# Patient Record
Sex: FEMALE | Race: WHITE | NOT HISPANIC OR LATINO | URBAN - METROPOLITAN AREA
[De-identification: names, ages, dates, MRNs, and addresses within clinical notes are randomized per-mention and may not be internally consistent; named-entity substitution may affect disease eponyms.]

---

## 2023-11-14 ENCOUNTER — HOSPITAL ENCOUNTER (EMERGENCY)
Facility: HOSPITAL | Age: 88
Discharge: HOME | End: 2023-11-15
Attending: EMERGENCY MEDICINE
Payer: MEDICARE

## 2023-11-14 DIAGNOSIS — M54.50 BILATERAL LOW BACK PAIN WITHOUT SCIATICA, UNSPECIFIED CHRONICITY: ICD-10-CM

## 2023-11-14 DIAGNOSIS — R00.0 TACHYCARDIA: ICD-10-CM

## 2023-11-14 DIAGNOSIS — R91.8 MASS OF RIGHT LUNG: Primary | ICD-10-CM

## 2023-11-14 PROCEDURE — P9612 CATHETERIZE FOR URINE SPEC: HCPCS

## 2023-11-14 PROCEDURE — 99285 EMERGENCY DEPT VISIT HI MDM: CPT | Mod: 25

## 2023-11-14 PROCEDURE — 85025 COMPLETE CBC W/AUTO DIFF WBC: CPT | Performed by: EMERGENCY MEDICINE

## 2023-11-14 PROCEDURE — 96360 HYDRATION IV INFUSION INIT: CPT

## 2023-11-14 PROCEDURE — 83735 ASSAY OF MAGNESIUM: CPT | Performed by: EMERGENCY MEDICINE

## 2023-11-14 PROCEDURE — 80053 COMPREHEN METABOLIC PANEL: CPT | Performed by: EMERGENCY MEDICINE

## 2023-11-15 VITALS
RESPIRATION RATE: 23 BRPM | TEMPERATURE: 98 F | SYSTOLIC BLOOD PRESSURE: 169 MMHG | HEART RATE: 107 BPM | DIASTOLIC BLOOD PRESSURE: 67 MMHG | OXYGEN SATURATION: 97 %

## 2023-11-15 LAB
ALBUMIN SERPL BCP-MCNC: 3.8 G/DL (ref 3.5–5.2)
ALP SERPL-CCNC: 181 U/L (ref 55–135)
ALT SERPL W/O P-5'-P-CCNC: 12 U/L (ref 10–44)
ANION GAP SERPL CALC-SCNC: 14 MMOL/L (ref 8–16)
AST SERPL-CCNC: 24 U/L (ref 10–40)
BASOPHILS # BLD AUTO: 0.08 K/UL (ref 0–0.2)
BASOPHILS NFR BLD: 1 % (ref 0–1.9)
BILIRUB SERPL-MCNC: 0.5 MG/DL (ref 0.1–1)
BILIRUB UR QL STRIP: NEGATIVE
BUN SERPL-MCNC: 17 MG/DL (ref 10–30)
CALCIUM SERPL-MCNC: 9.5 MG/DL (ref 8.7–10.5)
CHLORIDE SERPL-SCNC: 101 MMOL/L (ref 95–110)
CLARITY UR: CLEAR
CO2 SERPL-SCNC: 22 MMOL/L (ref 23–29)
COLOR UR: YELLOW
CREAT SERPL-MCNC: 0.8 MG/DL (ref 0.5–1.4)
DIFFERENTIAL METHOD: ABNORMAL
EOSINOPHIL # BLD AUTO: 0.2 K/UL (ref 0–0.5)
EOSINOPHIL NFR BLD: 2.6 % (ref 0–8)
ERYTHROCYTE [DISTWIDTH] IN BLOOD BY AUTOMATED COUNT: 13.3 % (ref 11.5–14.5)
EST. GFR  (NO RACE VARIABLE): >60 ML/MIN/1.73 M^2
GLUCOSE SERPL-MCNC: 118 MG/DL (ref 70–110)
GLUCOSE UR QL STRIP: ABNORMAL
HCT VFR BLD AUTO: 50.4 % (ref 37–48.5)
HGB BLD-MCNC: 16.8 G/DL (ref 12–16)
HGB UR QL STRIP: NEGATIVE
IMM GRANULOCYTES # BLD AUTO: 0.04 K/UL (ref 0–0.04)
IMM GRANULOCYTES NFR BLD AUTO: 0.5 % (ref 0–0.5)
KETONES UR QL STRIP: NEGATIVE
LACTATE SERPL-SCNC: 1.8 MMOL/L (ref 0.5–2.2)
LEUKOCYTE ESTERASE UR QL STRIP: NEGATIVE
LYMPHOCYTES # BLD AUTO: 2.8 K/UL (ref 1–4.8)
LYMPHOCYTES NFR BLD: 36.1 % (ref 18–48)
MAGNESIUM SERPL-MCNC: 2 MG/DL (ref 1.6–2.6)
MCH RBC QN AUTO: 30.3 PG (ref 27–31)
MCHC RBC AUTO-ENTMCNC: 33.3 G/DL (ref 32–36)
MCV RBC AUTO: 91 FL (ref 82–98)
MONOCYTES # BLD AUTO: 0.6 K/UL (ref 0.3–1)
MONOCYTES NFR BLD: 7.1 % (ref 4–15)
NEUTROPHILS # BLD AUTO: 4.1 K/UL (ref 1.8–7.7)
NEUTROPHILS NFR BLD: 53.2 % (ref 38–73)
NITRITE UR QL STRIP: NEGATIVE
NRBC BLD-RTO: 0 /100 WBC
PH UR STRIP: 6 [PH] (ref 5–8)
PLATELET # BLD AUTO: 265 K/UL (ref 150–450)
PMV BLD AUTO: 9.2 FL (ref 9.2–12.9)
POTASSIUM SERPL-SCNC: 3.9 MMOL/L (ref 3.5–5.1)
PROT SERPL-MCNC: 7.9 G/DL (ref 6–8.4)
PROT UR QL STRIP: NEGATIVE
RBC # BLD AUTO: 5.54 M/UL (ref 4–5.4)
SODIUM SERPL-SCNC: 137 MMOL/L (ref 136–145)
SP GR UR STRIP: 1.01 (ref 1–1.03)
TROPONIN I SERPL DL<=0.01 NG/ML-MCNC: 0.01 NG/ML (ref 0–0.03)
URN SPEC COLLECT METH UR: ABNORMAL
UROBILINOGEN UR STRIP-ACNC: NEGATIVE EU/DL
WBC # BLD AUTO: 7.75 K/UL (ref 3.9–12.7)

## 2023-11-15 PROCEDURE — 63600175 PHARM REV CODE 636 W HCPCS: Performed by: EMERGENCY MEDICINE

## 2023-11-15 PROCEDURE — 93010 EKG 12-LEAD: ICD-10-PCS | Mod: ,,, | Performed by: INTERNAL MEDICINE

## 2023-11-15 PROCEDURE — 83605 ASSAY OF LACTIC ACID: CPT | Performed by: EMERGENCY MEDICINE

## 2023-11-15 PROCEDURE — 93010 ELECTROCARDIOGRAM REPORT: CPT | Mod: ,,, | Performed by: INTERNAL MEDICINE

## 2023-11-15 PROCEDURE — 81003 URINALYSIS AUTO W/O SCOPE: CPT | Performed by: EMERGENCY MEDICINE

## 2023-11-15 PROCEDURE — 84484 ASSAY OF TROPONIN QUANT: CPT | Performed by: EMERGENCY MEDICINE

## 2023-11-15 PROCEDURE — 93005 ELECTROCARDIOGRAM TRACING: CPT

## 2023-11-15 PROCEDURE — 25000003 PHARM REV CODE 250: Performed by: EMERGENCY MEDICINE

## 2023-11-15 RX ORDER — ACETAMINOPHEN 500 MG
1000 TABLET ORAL
Status: COMPLETED | OUTPATIENT
Start: 2023-11-15 | End: 2023-11-15

## 2023-11-15 RX ADMIN — SODIUM CHLORIDE, SODIUM LACTATE, POTASSIUM CHLORIDE, AND CALCIUM CHLORIDE 1000 ML: .6; .31; .03; .02 INJECTION, SOLUTION INTRAVENOUS at 12:11

## 2023-11-15 RX ADMIN — ACETAMINOPHEN 1000 MG: 500 TABLET ORAL at 12:11

## 2023-11-15 NOTE — DISCHARGE INSTRUCTIONS
You can take Tylenol 1000 mg 3-4 times a day and 800 mg Ibuprofen 4 times a day as needed for your back pain.    Thank you for choosing [facility]! We appreciate you trusting us with your medical care.     It is important to remember that some problems are difficult to diagnose and may not be found during your first visit. Be sure to follow up with your primary care doctor and review any labs/imaging that was performed during your visit with them. If you do not have a primary care doctor, you may contact the one listed on your discharge paperwork, or you may also call the [facility] Appointment Desk at [phone] to schedule an appointment.     All medications may potentially have side effects and it is impossible to predict which medications may give you side effects. If you feel that you are having a negative effect of any medication you should immediately stop taking them and seek medical attention.  Do not drive or make any important decisions for 24 hours if you have received any pain medications, sedatives or mood altering drugs during your ER visit.    We will be happy to take care of you for all of your future medical needs. You may return to the ER at any time for any new/concerning symptoms, worsening condition, or failure to improve. We hope you feel better soon.     Gris Mahoney MD  Board Certified Emergency Medicine Physician

## 2023-11-15 NOTE — ED PROVIDER NOTES
Encounter Date: 11/14/2023       History     Chief Complaint   Patient presents with    Flank Pain     Worsening bilateral flank pain with dysuria for 3 days. Daughter reports visible hematuria 2 days ago, clear with last 2 days but with worsening pain. Denies fever.      89-year-old female past medical history of hypertension presents with complaint of flank pain.  Patient's daughter says that for the past several days patient has occasionally complained lower back pain on the left side.  Says that 2 days ago she noticed her mom had some pink tinged urine that has since resolved.  Says that patient has been tolerating p.o. well, with no nausea, vomiting, diarrhea.  The only medication she takes is losartan for her blood pressure.  Says that her mom has been able to ambulate with the walker at home at baseline, but due to the back discomfort has recently required assistance getting up to the standing position. Says that patient has not seen a PCP or had blood work checked in about 4 years. Says pt does not have a prior history of frequent UTIs or nephrolithiasis.  Denies that patient has had any falls or trauma. Says pt has dementia and is at her baseline mental status. Prior hx of hysterectomy.     The history is provided by the patient and a relative.     Review of patient's allergies indicates:  Not on File  No past medical history on file.  No past surgical history on file.  No family history on file.     Review of Systems    Physical Exam     Initial Vitals   BP Pulse Resp Temp SpO2   11/14/23 2350 11/15/23 0002 11/15/23 0002 11/15/23 0002 11/15/23 0002   136/78 (!) 133 (!) 28 98 °F (36.7 °C) 97 %      MAP       --                Physical Exam    Nursing note and vitals reviewed.  Constitutional: She appears well-developed and well-nourished. She is not diaphoretic. No distress.   HENT:   Head: Normocephalic and atraumatic.   Eyes: EOM are normal. Pupils are equal, round, and reactive to light.   Neck: Neck  supple.   Normal range of motion.  Cardiovascular:  Regular rhythm.           Tachycardic    Pulmonary/Chest: Breath sounds normal. No respiratory distress. She has no wheezes.   Abdominal: Abdomen is soft. She exhibits no distension. There is no abdominal tenderness.   No CVAT bilaterally.   Musculoskeletal:         General: Normal range of motion.      Cervical back: Normal range of motion and neck supple.      Comments: No T or L spine tenderness.     Neurological: She is alert.   Oriented to self. Moving all extremities spontaneously.    Skin: Skin is warm and dry.   Psychiatric: She has a normal mood and affect.         ED Course   Procedures  Labs Reviewed   CBC W/ AUTO DIFFERENTIAL - Abnormal; Notable for the following components:       Result Value    RBC 5.54 (*)     Hemoglobin 16.8 (*)     Hematocrit 50.4 (*)     All other components within normal limits   COMPREHENSIVE METABOLIC PANEL - Abnormal; Notable for the following components:    CO2 22 (*)     Glucose 118 (*)     Alkaline Phosphatase 181 (*)     All other components within normal limits   URINALYSIS, REFLEX TO URINE CULTURE - Abnormal; Notable for the following components:    Glucose, UA 1+ (*)     All other components within normal limits    Narrative:     Specimen Source->Urine   MAGNESIUM   LACTIC ACID, PLASMA   TROPONIN I     EKG Readings: (Independently Interpreted)   Initial Reading: No STEMI. Rhythm: Sinus Tachycardia. Heart Rate: 135. Ectopy: No Ectopy. Conduction: Normal. Axis: Normal. Clinical Impression: Sinus Tachycardia     ECG Results              EKG 12-lead (In process)  Result time 11/15/23 09:50:19      In process by Interface, Lab In [facility] (11/15/23 09:50:19)                   Narrative:    Test Reason : R10.9,    Vent. Rate : 135 BPM     Atrial Rate : 135 BPM     P-R Int : 092 ms          QRS Dur : 060 ms      QT Int : 274 ms       P-R-T Axes : 049 073 -01 degrees     QTc Int : 411 ms    Sinus tachycardia with short  PA  Septal infarct ,age undetermined  Abnormal ECG  No previous ECGs available    Referred By: System System           Confirmed By:                                   Imaging Results              X-Ray Chest AP Portable (Final result)  Result time 11/15/23 00:29:01      Final result by Maxwell Rodriges DO (11/15/23 00:29:01)                   Impression:      Right hilar mass or lymph node.  Further evaluation with CT chest is recommended.      Electronically signed by: Maxwell Rodriges  Date:    11/15/2023  Time:    00:29               Narrative:    EXAMINATION:  XR CHEST AP PORTABLE    CLINICAL HISTORY:  Sepsis;    TECHNIQUE:  Single frontal view of the chest was performed.    COMPARISON:  None    FINDINGS:  There is prominence of the right perihilar region, underlying mass or hilar lymph node not excluded.  There is a right apical pleuroparenchymal scarring.  There is mild coarse background interstitial prominence.  The pleural spaces are clear.  The cardiac silhouette is unremarkable.  Osseous structures demonstrate degenerative changes.                                       Medications   lactated ringers bolus 1,000 mL (0 mLs Intravenous Stopped 11/15/23 0125)   acetaminophen tablet 1,000 mg (1,000 mg Oral Given 11/15/23 0030)     Medical Decision Making  89 yo F w reported PMHx only of HTN bib daughter with complaint of lower back pain for a few days. Afebrile but tachycardic on exam. No fevers/trauma/bowel/bladder dysfunction/leg weakness/hx of cancer or IVDA, exam w/o evidence to suggest cord compression, cauda equina or retroperitoneal process. Ddx includes but not limited to electrolyte abnormality, strain, fracture, contusion, infection. ECG w/o e/o arrhythmia. Pt's daughter notes that pt had to have her hysterectomy postponed 2/2 anxiety causing elevated HR and in her experience it is normal for her mom to have a HR at this rate while in a hospital. HR gradually improved over ED stay. No acute lab  abnormalities including lactate, troponin, electrolytes, renal function, WBC. CXR w/o infiltrate. UA w/o e/o infection.  Extensive shared decision-making conversation with patient's daughter regarding additional workup with CT imaging versus discharge with symptomatic therapy.  At this time, patient's daughter is comfortable with discharge with symptomatic therapy.  Patient has no reproducible tenderness to her back or abdomen on multiple repeat examinations throughout ED stay.  Informed about incidental finding of possible lymph node or mass in the right lung.  Patient's daughter says that they would not be interested in proceeding with additional workup given that her mom is 89.  Case management referral placed so that they can establish home health visits because she says that her mom will not go visit a doctor at a clinic.  Discharged with strict return precautions and outpatient follow up.    No acute emergent medical condition has been identified. The patient appears to be low risk for an emergent medical condition is appropriate for discharge with outpatient f/u as detailed in discharge instructions for reevaluation and possible continued outpatient workup and management. I have discussed the workup with the patient, who has verbalized understanding of the plan and need for outpatient follow-up.  This evaluation does not preclude the development of an emergent condition so in addition, I have advised the patient that they can return to the ED at any time with worsening or change of their symptoms, or with any other medical complaint.         Amount and/or Complexity of Data Reviewed  Independent Historian:      Details: Daughter at bedside   Labs: ordered. Decision-making details documented in ED Course.  Radiology: ordered and independent interpretation performed.  ECG/medicine tests: ordered and independent interpretation performed.    Risk  OTC drugs.  Prescription drug management.  Decision regarding  hospitalization.               ED Course as of 11/15/23 1622   Wed Nov 15, 2023   0004 Temp: 98 °F (36.7 °C) [AT]   0006 WBC: 7.75  Normal  [AT]   0006 Hemoglobin(!): 16.8  Mild  [AT]   0039 Lactate, Mina: 1.8  Normal  [AT]   0039 Creatinine: 0.8  Normal  [AT]   0039 CXR Impression:  Right hilar mass or lymph node.  Further evaluation with CT chest is recommended.   [AT]   0046 Troponin I: 0.013  Normal  [AT]   0131 Leukocytes, UA: Negative  Normal  [AT]   0131 NITRITE UA: Negative  Normal  [AT]      ED Course User Index  [AT] Gris Mahoney MD                      Clinical Impression:   Final diagnoses:  [R91.8] Mass of right lung (Primary)  [R00.0] Tachycardia  [M54.50] Bilateral low back pain without sciatica, unspecified chronicity        ED Disposition Condition    Discharge Stable          ED Prescriptions    None       Follow-up Information       Follow up With Specialties Details Why Contact Info Additional Information    [facility] Internal Medicine Internal Medicine Schedule an appointment as soon as possible for a visit in 2 days  [address]  [address]  [facility] [address] [phone] Please park in [address] or [address] and use [address] connolly. Take Medical Office Bldg elevators.    [facility] Emergency Dept Emergency Medicine  As needed, If symptoms worsen [address] [facility] [address] [phone]              Gris Mahoney MD  11/15/23 1622

## 2023-11-30 ENCOUNTER — ON-DEMAND VIRTUAL (OUTPATIENT)
Dept: URGENT CARE | Facility: CLINIC | Age: 88
End: 2023-11-30
Payer: MEDICARE

## 2023-11-30 DIAGNOSIS — R05.9 COUGH, UNSPECIFIED TYPE: ICD-10-CM

## 2023-11-30 DIAGNOSIS — J06.9 BACTERIAL UPPER RESPIRATORY INFECTION: Primary | ICD-10-CM

## 2023-11-30 DIAGNOSIS — B96.89 BACTERIAL UPPER RESPIRATORY INFECTION: Primary | ICD-10-CM

## 2023-11-30 PROCEDURE — 99213 OFFICE O/P EST LOW 20 MIN: CPT | Mod: 95,,,

## 2023-11-30 PROCEDURE — 99213 PR OFFICE/OUTPT VISIT, EST, LEVL III, 20-29 MIN: ICD-10-PCS | Mod: 95,,,

## 2023-11-30 RX ORDER — DOXYCYCLINE 100 MG/1
100 CAPSULE ORAL EVERY 12 HOURS
Qty: 14 CAPSULE | Refills: 0 | Status: SHIPPED | OUTPATIENT
Start: 2023-11-30 | End: 2023-12-07

## 2023-11-30 RX ORDER — METHYLPREDNISOLONE 4 MG/1
TABLET ORAL
Qty: 21 EACH | Refills: 0 | Status: SHIPPED | OUTPATIENT
Start: 2023-11-30 | End: 2023-12-21

## 2023-11-30 RX ORDER — AZITHROMYCIN 250 MG/1
TABLET, FILM COATED ORAL
Qty: 6 TABLET | Refills: 0 | Status: SHIPPED | OUTPATIENT
Start: 2023-11-30 | End: 2023-11-30

## 2023-11-30 RX ORDER — VALSARTAN 40 MG/1
40 TABLET ORAL DAILY
COMMUNITY
Start: 2023-10-10

## 2023-11-30 NOTE — PROGRESS NOTES
Subjective:      Patient ID: Tammi Plasencia is a 89 y.o. female.    Vitals:  vitals were not taken for this visit.     Chief Complaint: No chief complaint on file.      Visit Type: TELE AUDIOVISUAL    Present with the patient at the time of consultation: TELEMED PRESENT WITH PATIENT: family member    History reviewed. No pertinent past medical history.  History reviewed. No pertinent surgical history.  Review of patient's allergies indicates:  Not on File  Current Outpatient Medications on File Prior to Visit   Medication Sig Dispense Refill    valsartan (DIOVAN) 40 MG tablet Take 40 mg by mouth once daily.       No current facility-administered medications on file prior to visit.     History reviewed. No pertinent family history.    Medications Ordered                [facility] - [place] - [address]   [address] [address]    Telephone: [phone]   Fax: [phone]   Hours: Not open 24 hours                         E-Prescribed (1 of 2)              methylPREDNISolone (MEDROL DOSEPACK) 4 mg tablet    Sig: use as directed       Start: 11/30/23     Quantity: 21 each Refills: 0                               Unspecified Transmission Method (1 of 2)              doxycycline (VIBRAMYCIN) 100 MG Cap    Sig: Take 1 capsule (100 mg total) by mouth every 12 (twelve) hours. for 7 days       Start: 11/30/23     Quantity: 14 capsule Refills: 0                           Ohs Peq Odvv Intake    11/30/2023 10:13 AM CST - Filed by Patient   Describe your reason for todays visit Coughing and congestion   What is your current physical address in the event of a medical emergency? [address] [place], [place] [address] [address]   Are you able to take your vital signs? No   Please attach any relevant images or files          Patients daughter states that patient began to have cold like symptoms for the past 3 days. Daughter states that patient is having cough and congestion.  Daughter states that patient is drinking but will not eat much. Daughter states that patient has dementia and she takes care of her. Daughter denies any fever or other symptoms at this time. daughter states that the cough is a dry congestion. Daughter states that her biggest concern is her cough.         Constitution: Negative.   HENT:  Positive for congestion and postnasal drip.    Cardiovascular: Negative.    Eyes: Negative.    Respiratory:  Positive for cough.    Gastrointestinal: Negative.    Endocrine: negative.   Genitourinary: Negative.    Musculoskeletal: Negative.    Skin: Negative.    Allergic/Immunologic: Negative.    Neurological: Negative.    Hematologic/Lymphatic: Negative.    Psychiatric/Behavioral: Negative.          Objective:   The physical exam was conducted virtually.  Physical Exam   HENT:   Head: Normocephalic and atraumatic.   Eyes: Conjunctivae are normal. Pupils are equal, round, and reactive to light. Extraocular movement intact   Neck: Neck supple.   Pulmonary/Chest: Effort normal.   Abdominal: Normal appearance.   Neurological: She is alert.   Skin: Skin is warm.   Psychiatric: Mood normal.       Assessment:     1. Bacterial upper respiratory infection    2. Cough, unspecified type        Plan:       Bacterial upper respiratory infection  -     doxycycline (VIBRAMYCIN) 100 MG Cap; Take 1 capsule (100 mg total) by mouth every 12 (twelve) hours. for 7 days  Dispense: 14 capsule; Refill: 0    Cough, unspecified type  -     methylPREDNISolone (MEDROL DOSEPACK) 4 mg tablet; use as directed  Dispense: 21 each; Refill: 0  -     Discontinue: azithromycin (Z-HARIS) 250 MG tablet; Take 2 tablets by mouth on day 1; Take 1 tablet by mouth on days 2-5  Dispense: 6 tablet; Refill: 0  -     doxycycline (VIBRAMYCIN) 100 MG Cap; Take 1 capsule (100 mg total) by mouth every 12 (twelve) hours. for 7 days  Dispense: 14 capsule; Refill: 0